# Patient Record
Sex: FEMALE | Race: BLACK OR AFRICAN AMERICAN | NOT HISPANIC OR LATINO | RURAL
[De-identification: names, ages, dates, MRNs, and addresses within clinical notes are randomized per-mention and may not be internally consistent; named-entity substitution may affect disease eponyms.]

---

## 2022-07-14 RX ORDER — TIZANIDINE 4 MG/1
4 TABLET ORAL 2 TIMES DAILY
COMMUNITY

## 2022-07-14 RX ORDER — DULOXETIN HYDROCHLORIDE 60 MG/1
60 CAPSULE, DELAYED RELEASE ORAL DAILY
COMMUNITY

## 2022-07-14 RX ORDER — HYDROCODONE BITARTRATE AND ACETAMINOPHEN 10; 325 MG/1; MG/1
1 TABLET ORAL EVERY 8 HOURS PRN
COMMUNITY

## 2022-07-14 RX ORDER — MELOXICAM 7.5 MG/1
7.5 TABLET ORAL DAILY
COMMUNITY

## 2022-07-20 ENCOUNTER — HOSPITAL ENCOUNTER (OUTPATIENT)
Facility: HOSPITAL | Age: 67
Discharge: HOME | End: 2022-07-20
Attending: ANESTHESIOLOGY | Admitting: ANESTHESIOLOGY
Payer: OTHER MISCELLANEOUS

## 2022-07-20 ENCOUNTER — ANESTHESIA EVENT (OUTPATIENT)
Dept: PAIN MEDICINE | Facility: HOSPITAL | Age: 67
End: 2022-07-20
Payer: OTHER MISCELLANEOUS

## 2022-07-20 ENCOUNTER — ANESTHESIA (OUTPATIENT)
Dept: PAIN MEDICINE | Facility: HOSPITAL | Age: 67
End: 2022-07-20
Payer: OTHER MISCELLANEOUS

## 2022-07-20 VITALS
WEIGHT: 179 LBS | SYSTOLIC BLOOD PRESSURE: 122 MMHG | HEIGHT: 63 IN | HEART RATE: 60 BPM | DIASTOLIC BLOOD PRESSURE: 62 MMHG | RESPIRATION RATE: 18 BRPM | OXYGEN SATURATION: 97 % | TEMPERATURE: 98 F | BODY MASS INDEX: 31.71 KG/M2

## 2022-07-20 DIAGNOSIS — M54.12 CERVICAL RADICULOPATHY: ICD-10-CM

## 2022-07-20 PROCEDURE — 63600175 PHARM REV CODE 636 W HCPCS: Performed by: ANESTHESIOLOGY

## 2022-07-20 PROCEDURE — 62321 NJX INTERLAMINAR CRV/THRC: CPT | Performed by: ANESTHESIOLOGY

## 2022-07-20 PROCEDURE — 27000284 HC CANNULA NASAL: Performed by: NURSE ANESTHETIST, CERTIFIED REGISTERED

## 2022-07-20 PROCEDURE — 37000008 HC ANESTHESIA 1ST 15 MINUTES: Performed by: ANESTHESIOLOGY

## 2022-07-20 PROCEDURE — D9220A PRA ANESTHESIA: ICD-10-PCS | Mod: ,,, | Performed by: NURSE ANESTHETIST, CERTIFIED REGISTERED

## 2022-07-20 PROCEDURE — 25000003 PHARM REV CODE 250: Performed by: NURSE ANESTHETIST, CERTIFIED REGISTERED

## 2022-07-20 PROCEDURE — 25000003 PHARM REV CODE 250: Performed by: ANESTHESIOLOGY

## 2022-07-20 PROCEDURE — 27201423 OPTIME MED/SURG SUP & DEVICES STERILE SUPPLY: Performed by: ANESTHESIOLOGY

## 2022-07-20 PROCEDURE — 63600175 PHARM REV CODE 636 W HCPCS: Performed by: NURSE ANESTHETIST, CERTIFIED REGISTERED

## 2022-07-20 PROCEDURE — D9220A PRA ANESTHESIA: Mod: ,,, | Performed by: NURSE ANESTHETIST, CERTIFIED REGISTERED

## 2022-07-20 PROCEDURE — 25500020 PHARM REV CODE 255: Performed by: ANESTHESIOLOGY

## 2022-07-20 RX ORDER — FENTANYL CITRATE 50 UG/ML
INJECTION, SOLUTION INTRAMUSCULAR; INTRAVENOUS
Status: DISCONTINUED | OUTPATIENT
Start: 2022-07-20 | End: 2022-07-20

## 2022-07-20 RX ORDER — SODIUM CHLORIDE 9 MG/ML
INJECTION, SOLUTION INTRAVENOUS CONTINUOUS PRN
Status: DISCONTINUED | OUTPATIENT
Start: 2022-07-20 | End: 2022-07-20

## 2022-07-20 RX ORDER — TRIAMCINOLONE ACETONIDE 40 MG/ML
INJECTION, SUSPENSION INTRA-ARTICULAR; INTRAMUSCULAR
Status: DISCONTINUED | OUTPATIENT
Start: 2022-07-20 | End: 2022-07-20 | Stop reason: HOSPADM

## 2022-07-20 RX ORDER — IOPAMIDOL 612 MG/ML
INJECTION, SOLUTION INTRATHECAL
Status: DISCONTINUED | OUTPATIENT
Start: 2022-07-20 | End: 2022-07-20 | Stop reason: HOSPADM

## 2022-07-20 RX ORDER — AMLODIPINE BESYLATE 10 MG/1
10 TABLET ORAL DAILY
COMMUNITY

## 2022-07-20 RX ORDER — ATORVASTATIN CALCIUM 20 MG/1
20 TABLET, FILM COATED ORAL DAILY
COMMUNITY

## 2022-07-20 RX ORDER — BUPIVACAINE HYDROCHLORIDE 2.5 MG/ML
INJECTION, SOLUTION INFILTRATION; PERINEURAL
Status: DISCONTINUED | OUTPATIENT
Start: 2022-07-20 | End: 2022-07-20 | Stop reason: HOSPADM

## 2022-07-20 RX ORDER — PROPOFOL 10 MG/ML
VIAL (ML) INTRAVENOUS
Status: DISCONTINUED | OUTPATIENT
Start: 2022-07-20 | End: 2022-07-20

## 2022-07-20 RX ORDER — LIDOCAINE HYDROCHLORIDE 20 MG/ML
INJECTION, SOLUTION EPIDURAL; INFILTRATION; INTRACAUDAL; PERINEURAL
Status: DISCONTINUED | OUTPATIENT
Start: 2022-07-20 | End: 2022-07-20

## 2022-07-20 RX ADMIN — LIDOCAINE HYDROCHLORIDE 50 MG: 20 INJECTION, SOLUTION EPIDURAL; INFILTRATION; INTRACAUDAL; PERINEURAL at 11:07

## 2022-07-20 RX ADMIN — SODIUM CHLORIDE: 9 INJECTION, SOLUTION INTRAVENOUS at 11:07

## 2022-07-20 RX ADMIN — PROPOFOL 50 MG: 10 INJECTION, EMULSION INTRAVENOUS at 11:07

## 2022-07-20 RX ADMIN — FENTANYL CITRATE 100 MCG: 50 INJECTION INTRAMUSCULAR; INTRAVENOUS at 11:07

## 2022-07-20 NOTE — PLAN OF CARE
Plan:     D/c pt via wheelchair at 1235    Informed pt if does not void in 8 hours to go to ER. Notify if redness, drainage, from injection site or fever over next 3-4 days. Rest and drink plenty of fluids for the remainder of the day. No lifting over 5 lbs. For the remainder of the day. Continue regular medications as prescribed. May take pain medications as prescribed.     Pain improved 100%

## 2022-07-20 NOTE — OP NOTE
PREOPERATIVE DIAGNOSIS:     Cervical Radiculopathy                                                                Neck Pain         POSTOPERATIVE DIAGNOSIS:  Cervical Radiculopathy                                                                Neck Pain         PROCEDURE:  Catheter Guided Cervical Epidural Steroid Injection under Fluoroscopic Guidance at C5-6 level.          COMPLICATIONS: None    ANESTHESIA:  MAC    DRAINS AND PACKS:  None    BLOOD LOSS:  None    The patient was identified in the holding area.  The risks and benefits of the procedure were again explained to the patient and the patient agreed to proceed.  The patient was taken in stable condition to the procedure room and was placed in prone position on the C-Arm table.  All pressure points were checked and padded comfortably while the patient was awake.  Time out was completed.  Standard ASA monitors applied.  Anesthesia was initiated.  Patient was comfortable and the neck was then prepped and draped in usual sterile fashion. The skin wheal  using Sensorcaine 0.25% (2.5 mg/ml) 1cc was raised over the C7-T1 interspaces and an 18 gauge needle was advanced under fluoroscopic guidance into the epidural space with loss of resistance confirmed with air.  The stylet was removed and there was negative aspiration of heme and CSF.  A Versicath Catheter was advanced to the target level at  C 5-6  level. The patient then received a 2 cc allotment of Isovue-m300 contrast with excellent delineation within the epidural space.  After confirmation and appropriate placement of the cannula, the patient then received  Kenalog 40mg/ml 1ml with 2ccs of 0.25% Sensorcaine(2.5mg/ml) and 2ccs of preservative free Saline.  The needle was removed with tip intact.  There was adequate hemostasis at the conclusion of the procedure. The patient was taken in stable condition to the holding area and monitored for the appropriate time of convalescence.  The patient tolerated the  procedure well with no adverse events.          The patients preoperative pain score was 10 /10.     The postoperative pain score is  /10.

## 2022-07-20 NOTE — TRANSFER OF CARE
"Anesthesia Transfer of Care Note    Patient: Kaycee Munguia    Procedure(s) Performed: Procedure(s) (LRB):  INJECTION, STEROID, SPINE, CERVICAL, EPIDURAL (N/A)    Patient location: PACU    Anesthesia Type: general    Transport from OR: Transported from OR on room air with adequate spontaneous ventilation    Post pain: adequate analgesia    Post assessment: no apparent anesthetic complications    Post vital signs: stable    Level of consciousness: responds to stimulation    Nausea/Vomiting: no nausea/vomiting    Complications: none    Transfer of care protocol was followed      Last vitals:   Visit Vitals  BP (!) 145/68   Pulse 83   Temp 36.7 °C (98 °F)   Resp 14   Ht 5' 3" (1.6 m)   Wt 81.2 kg (179 lb)   SpO2 96%   BMI 31.71 kg/m²     "

## 2022-07-20 NOTE — ANESTHESIA POSTPROCEDURE EVALUATION
Anesthesia Post Evaluation    Patient: Kaycee Munguia    Procedure(s) Performed: Procedure(s) (LRB):  INJECTION, STEROID, SPINE, CERVICAL, EPIDURAL (N/A)    Final Anesthesia Type: general      Patient location during evaluation: PACU  Patient participation: Yes- Able to Participate  Level of consciousness: awake and alert  Post-procedure vital signs: reviewed and stable  Pain management: adequate  Airway patency: patent  HUBER mitigation strategies: Multimodal analgesia  PONV status at discharge: No PONV  Anesthetic complications: no      Cardiovascular status: blood pressure returned to baseline  Respiratory status: unassisted  Hydration status: euvolemic  Follow-up not needed.          Vitals Value Taken Time   /54 07/20/22 1215   Temp 36.7 °C (98 °F) 07/20/22 1158   Pulse 58 07/20/22 1222   Resp 15 07/20/22 1215   SpO2 97 % 07/20/22 1222   Vitals shown include unvalidated device data.      No case tracking events are documented in the log.      Pain/Mavis Score: Mavis Score: 10 (7/20/2022 12:15 PM)

## 2022-07-20 NOTE — DISCHARGE INSTRUCTIONS
No driving, operating machinery, making legal decisions, or signing legal documents until tomorrow.   Continue diet as tolerated. Drink plenty of fluids and rest.   If unable to void in 8 hours proceed to nearest ER.   Notify MD of redness or drainage from incision site as well as any fever over the next 3-4 days.  No lifting over 5 lbs for the next 24 hrs.   Continue medications as prescribed. May take pain medication as prescribed.   May shower tomorrow. No tub baths for 48 hours following procedure.   May remove bandaids tomorrow, if they fall off before tomorrow they do not have to be replaced.

## 2022-07-20 NOTE — ANESTHESIA PREPROCEDURE EVALUATION
07/20/2022  Kaycee Munguia is a 67 y.o., female.      Pre-op Assessment    I have reviewed the Patient Summary Reports.     I have reviewed the Nursing Notes. I have reviewed the NPO Status.   I have reviewed the Medications.     Review of Systems  Anesthesia Hx:  No problems with previous Anesthesia  Family Hx of Anesthesia complications:  Personal Hx of Anesthesia complications   Social:  Non-Smoker    Hematology/Oncology:  Hematology Normal   Oncology Normal     EENT/Dental:EENT/Dental Normal   Cardiovascular:   Hypertension hyperlipidemia    Pulmonary:   Sleep Apnea    Renal/:  Renal/ Normal     Hepatic/GI:  Hepatic/GI Normal    Musculoskeletal:  Musculoskeletal Normal    Neurological:  Neurology Normal    Endocrine:  Endocrine Normal  Obesity / BMI > 30  Dermatological:  Skin Normal    Psych:  Psychiatric Normal           Physical Exam  General: Well nourished, Cooperative, Alert and Oriented    Airway:  Mallampati: II   Mouth Opening: Normal  TM Distance: Normal  Tongue: Normal  Neck ROM: Normal ROM    Chest/Lungs:  Clear to auscultation, Normal Respiratory Rate    Heart:  Rate: Normal  Rhythm: Regular Rhythm    Abdomen:  Normal, Soft        Anesthesia Plan  Type of Anesthesia, risks & benefits discussed:    Anesthesia Type: Gen Natural Airway  Intra-op Monitoring Plan: Standard ASA Monitors  Post Op Pain Control Plan: multimodal analgesia  Induction:  IV  Informed Consent: Informed consent signed with the Patient and all parties understand the risks and agree with anesthesia plan.  All questions answered. Patient consented to blood products? Yes  ASA Score: 3    Ready For Surgery From Anesthesia Perspective.     .

## 2023-05-17 ENCOUNTER — ANESTHESIA EVENT (OUTPATIENT)
Dept: PAIN MEDICINE | Facility: HOSPITAL | Age: 68
End: 2023-05-17
Payer: OTHER MISCELLANEOUS

## 2023-05-17 ENCOUNTER — ANESTHESIA (OUTPATIENT)
Dept: PAIN MEDICINE | Facility: HOSPITAL | Age: 68
End: 2023-05-17
Payer: OTHER MISCELLANEOUS

## 2023-05-17 ENCOUNTER — HOSPITAL ENCOUNTER (OUTPATIENT)
Facility: HOSPITAL | Age: 68
Discharge: HOME | End: 2023-05-17
Attending: ANESTHESIOLOGY | Admitting: ANESTHESIOLOGY
Payer: OTHER MISCELLANEOUS

## 2023-05-17 VITALS
BODY MASS INDEX: 32.57 KG/M2 | HEIGHT: 62 IN | SYSTOLIC BLOOD PRESSURE: 131 MMHG | HEART RATE: 56 BPM | DIASTOLIC BLOOD PRESSURE: 67 MMHG | RESPIRATION RATE: 16 BRPM | WEIGHT: 177 LBS | TEMPERATURE: 97 F | OXYGEN SATURATION: 98 %

## 2023-05-17 DIAGNOSIS — M54.12 CERVICAL RADICULOPATHY: ICD-10-CM

## 2023-05-17 PROCEDURE — 27000716 HC OXISENSOR PROBE, ANY SIZE: Performed by: NURSE ANESTHETIST, CERTIFIED REGISTERED

## 2023-05-17 PROCEDURE — 63600175 PHARM REV CODE 636 W HCPCS: Performed by: ANESTHESIOLOGY

## 2023-05-17 PROCEDURE — 25000003 PHARM REV CODE 250: Performed by: ANESTHESIOLOGY

## 2023-05-17 PROCEDURE — D9220A PRA ANESTHESIA: Mod: ,,, | Performed by: NURSE ANESTHETIST, CERTIFIED REGISTERED

## 2023-05-17 PROCEDURE — 25000003 PHARM REV CODE 250: Performed by: NURSE ANESTHETIST, CERTIFIED REGISTERED

## 2023-05-17 PROCEDURE — 25500020 PHARM REV CODE 255: Performed by: ANESTHESIOLOGY

## 2023-05-17 PROCEDURE — 63600175 PHARM REV CODE 636 W HCPCS: Performed by: NURSE ANESTHETIST, CERTIFIED REGISTERED

## 2023-05-17 PROCEDURE — 62321 NJX INTERLAMINAR CRV/THRC: CPT | Performed by: ANESTHESIOLOGY

## 2023-05-17 PROCEDURE — D9220A PRA ANESTHESIA: ICD-10-PCS | Mod: ,,, | Performed by: NURSE ANESTHETIST, CERTIFIED REGISTERED

## 2023-05-17 PROCEDURE — 37000008 HC ANESTHESIA 1ST 15 MINUTES: Performed by: ANESTHESIOLOGY

## 2023-05-17 PROCEDURE — 27000284 HC CANNULA NASAL: Performed by: NURSE ANESTHETIST, CERTIFIED REGISTERED

## 2023-05-17 RX ORDER — TRIAMCINOLONE ACETONIDE 40 MG/ML
INJECTION, SUSPENSION INTRA-ARTICULAR; INTRAMUSCULAR CODE/TRAUMA/SEDATION MEDICATION
Status: DISCONTINUED | OUTPATIENT
Start: 2023-05-17 | End: 2023-05-17 | Stop reason: HOSPADM

## 2023-05-17 RX ORDER — SODIUM CHLORIDE 9 MG/ML
500 INJECTION, SOLUTION INTRAVENOUS CONTINUOUS
Status: DISCONTINUED | OUTPATIENT
Start: 2023-05-17 | End: 2023-05-17 | Stop reason: HOSPADM

## 2023-05-17 RX ORDER — PROPOFOL 10 MG/ML
INJECTION, EMULSION INTRAVENOUS
Status: DISCONTINUED | OUTPATIENT
Start: 2023-05-17 | End: 2023-05-17

## 2023-05-17 RX ORDER — LIDOCAINE HYDROCHLORIDE 20 MG/ML
INJECTION INTRAVENOUS
Status: DISCONTINUED | OUTPATIENT
Start: 2023-05-17 | End: 2023-05-17

## 2023-05-17 RX ORDER — BUPIVACAINE HYDROCHLORIDE 2.5 MG/ML
INJECTION, SOLUTION INFILTRATION; PERINEURAL CODE/TRAUMA/SEDATION MEDICATION
Status: DISCONTINUED | OUTPATIENT
Start: 2023-05-17 | End: 2023-05-17 | Stop reason: HOSPADM

## 2023-05-17 RX ORDER — IOPAMIDOL 612 MG/ML
INJECTION, SOLUTION INTRATHECAL CODE/TRAUMA/SEDATION MEDICATION
Status: DISCONTINUED | OUTPATIENT
Start: 2023-05-17 | End: 2023-05-17 | Stop reason: HOSPADM

## 2023-05-17 RX ADMIN — LIDOCAINE HYDROCHLORIDE 100 MG: 20 INJECTION, SOLUTION INTRAVENOUS at 01:05

## 2023-05-17 RX ADMIN — SODIUM CHLORIDE: 9 INJECTION, SOLUTION INTRAVENOUS at 01:05

## 2023-05-17 RX ADMIN — PROPOFOL 100 MG: 10 INJECTION, EMULSION INTRAVENOUS at 01:05

## 2023-05-17 NOTE — ANESTHESIA POSTPROCEDURE EVALUATION
Anesthesia Post Evaluation    Patient: Kaycee Munguia    Procedure(s) Performed: Procedure(s) (LRB):  INJECTION, STEROID, SPINE, CERVICAL, EPIDURAL (N/A)    Final Anesthesia Type: general      [facility]  Patient participation: Yes- Able to Participate  Level of consciousness: awake and alert  Post-procedure vital signs: reviewed and stable  Pain management: adequate  Airway patency: patent    PONV status at discharge: No PONV  Anesthetic complications: no      Cardiovascular status: blood pressure returned to baseline, hemodynamically stable and stable  Respiratory status: unassisted  Hydration status: euvolemic  Follow-up not needed.  Comments: Pt voices appreciation for care          Vitals Value Taken Time   /66 05/17/23 1406   Temp 97.3 05/17/23 1610   Pulse 58 05/17/23 1407   Resp 14 05/17/23 1407   SpO2 98 % 05/17/23 1407   Vitals shown include unvalidated device data.      Event Time   Out of Recovery 14:06:00         Pain/Mavis Score: Mavis Score: 10 (5/17/2023  2:05 PM)

## 2023-05-17 NOTE — DISCHARGE SUMMARY
Patient underwent  Catheter Guided Cervical Epidural Steroid Injection under Fluoroscopic Guidance at C5/6 level  procedure 05/17/2023. The pt will follow up in clinic. Discharged home. Discharge Dx:Cervical Radiculopathy

## 2023-05-17 NOTE — OP NOTE
05/17/2023  PREOPERATIVE DIAGNOSIS:     Cervical Radiculopathy                                                                Neck Pain         POSTOPERATIVE DIAGNOSIS:  Cervical Radiculopathy                                                                Neck Pain         PROCEDURE:  Catheter Guided Cervical Epidural Steroid Injection under Fluoroscopic Guidance at C 5/6 level.          SURGEON: Dr Hernán Dang    COMPLICATIONS: None    ANESTHESIA:  MAC    DRAINS AND PACKS:  None    BLOOD LOSS:  None    The patient was identified in the holding area.  The risks and benefits of the procedure were again explained to the patient and the patient agreed to proceed.  The patient was taken in stable condition to the procedure room and was placed in prone position on the C-Arm table.  All pressure points were checked and padded comfortably while the patient was awake.  Time out was completed.  Standard ASA monitors applied.  Anesthesia was initiated.  Patient was comfortable and the neck was then prepped and draped in usual sterile fashion. The skin wheal  using Bupivacaine 0.25% (2.5 mg/ml) 1cc was raised over the C7-T1 interspaces and an 18 gauge needle was advanced under fluoroscopic guidance into the epidural space with loss of resistance confirmed with air.  The stylet was removed and there was negative aspiration of heme and CSF.  A Versicath Catheter was advanced to the target level at  C 5-6  level. The patient then received a 2 cc allotment of Isovue M 300 contrast with excellent delineation within the epidural space.  After confirmation and appropriate placement of the cannula, the patient then received  Kenalog 40mg/ml 1ml with 2ccs of 0.25% Bupivacaine(2.5mg/ml) and 2ccs of preservative free Saline.  The needle was removed with tip intact.  There was adequate hemostasis at the conclusion of the procedure. The patient was taken in stable condition to the holding area and monitored for the appropriate time of  convalescence.  The patient tolerated the procedure well with no adverse events.     The patients preoperative pain score was  10/10.     The postoperative pain score is  0/10.

## 2023-05-17 NOTE — H&P
No changes from original H & P note dated 04/24/2023 and has been reviewed and is valid. See scanned note.

## 2023-05-17 NOTE — ANESTHESIA PREPROCEDURE EVALUATION
05/17/2023  Kaycee Munguia is a 67 y.o., female.      Pre-op Assessment    I have reviewed the Patient Summary Reports.     I have reviewed the Nursing Notes. I have reviewed the NPO Status.   I have reviewed the Medications.     Review of Systems  Anesthesia Hx:  No problems with previous Anesthesia    Social:  Non-Smoker, No Alcohol Use    Cardiovascular:   Hypertension, well controlled    Pulmonary:   Sleep Apnea, CPAP    Endocrine:  Obesity / BMI > 30      Physical Exam  General: Well nourished, Cooperative, Alert and Oriented    Airway:  Mallampati: II   Mouth Opening: Normal  TM Distance: Normal  Tongue: Normal  Neck ROM: Normal ROM    Dental:  Dentures        Anesthesia Plan  Type of Anesthesia, risks & benefits discussed:    Anesthesia Type: Gen Natural Airway, MAC  Intra-op Monitoring Plan: Standard ASA Monitors  Post Op Pain Control Plan: multimodal analgesia and IV/PO Opioids PRN  Induction:  IV  Informed Consent: Informed consent signed with the Patient and all parties understand the risks and agree with anesthesia plan.  All questions answered. Patient consented to blood products? Yes  ASA Score: 3  Day of Surgery Review of History & Physical: I have interviewed and examined the patient. I have reviewed the patient's H&P dated: There are no significant changes.     Ready For Surgery From Anesthesia Perspective.     .   Past Medical History:   Diagnosis Date    Hyperlipidemia     Hypertension     Sleep apnea        Past Surgical History:   Procedure Laterality Date    EPIDURAL STEROID INJECTION INTO CERVICAL SPINE N/A 7/20/2022    Procedure: INJECTION, STEROID, SPINE, CERVICAL, EPIDURAL;  Surgeon: Hernán Dang MD;  Location: [facility];  Service: Pain Management;  Laterality: N/A;  C5-6 cath guided MARIAH    HYSTERECTOMY         Family History   Problem Relation Age of Onset     Cancer Mother     Heart disease Father        Social History     Socioeconomic History    Marital status: Single   Tobacco Use    Smoking status: Never    Smokeless tobacco: Never   Substance and Sexual Activity    Alcohol use: Not Currently       No current facility-administered medications for this encounter.       Review of patient's allergies indicates:  No Known Allergies  There is no problem list on file for this patient.

## 2023-05-17 NOTE — PLAN OF CARE
REFER TO WRITTEN DOCUMENT AND RECOVERY INFORMATION.    D/CD PATIENT VIAA WHEELCHAIR AT 1415.    INFORMED PATIENT IF UNABLE TO VOID IN 8 HOURS, GO TO ER. NOTIFY MD OF REDNESS OR DRAINAGE FROM INJECTION SITE OR FEVER OVER 3-4 DAY. REST AND DRINK PLENTY OF FLUIDS FOR THE REMAINDER OF THE DAY. NO LIFTING OVER 5 LBS FOR THE REMAINDER OF THE DAY. CONTINUE REGULAR MEDICATIONS AS PRESCRIBED. MAY TAKE PAIN MEDICATION AS PRESCRIBED.     PAIN IMPROVED  100%

## 2023-05-17 NOTE — TRANSFER OF CARE
"Anesthesia Transfer of Care Note    Patient: Kaycee Munguia    Procedure(s) Performed: Procedure(s) (LRB):  INJECTION, STEROID, SPINE, CERVICAL, EPIDURAL (N/A)    Patient location: Other: [facility]    Anesthesia Type: general    Transport from OR: Transported from OR on room air with adequate spontaneous ventilation    Post pain: adequate analgesia    Post assessment: no apparent anesthetic complications    Post vital signs: stable    Level of consciousness: sedated and responds to stimulation    Nausea/Vomiting: no nausea/vomiting    Complications: none    Transfer of care protocol was followedComments: Good SV continue, NAD noted, VSS, RTRN      Last vitals:   Visit Vitals  BP (!) 105/58 (BP Location: Left arm, Patient Position: Lying)   Pulse 85   Temp 36.3 °C (97.4 °F) (Oral)   Resp 16   Ht 5' 2" (1.575 m)   Wt 80.3 kg (177 lb)   SpO2 96%   BMI 32.37 kg/m²     "

## 2024-10-16 ENCOUNTER — ANESTHESIA EVENT (OUTPATIENT)
Dept: PAIN MEDICINE | Facility: HOSPITAL | Age: 69
End: 2024-10-16
Payer: OTHER MISCELLANEOUS

## 2024-10-16 ENCOUNTER — HOSPITAL ENCOUNTER (OUTPATIENT)
Facility: HOSPITAL | Age: 69
Discharge: HOME | End: 2024-10-16
Attending: ANESTHESIOLOGY | Admitting: ANESTHESIOLOGY
Payer: OTHER MISCELLANEOUS

## 2024-10-16 ENCOUNTER — ANESTHESIA (OUTPATIENT)
Dept: PAIN MEDICINE | Facility: HOSPITAL | Age: 69
End: 2024-10-16
Payer: OTHER MISCELLANEOUS

## 2024-10-16 VITALS
DIASTOLIC BLOOD PRESSURE: 73 MMHG | RESPIRATION RATE: 17 BRPM | HEART RATE: 66 BPM | OXYGEN SATURATION: 100 % | WEIGHT: 187.81 LBS | TEMPERATURE: 98 F | BODY MASS INDEX: 35.46 KG/M2 | HEIGHT: 61 IN | SYSTOLIC BLOOD PRESSURE: 138 MMHG

## 2024-10-16 DIAGNOSIS — M54.12 CERVICAL RADICULOPATHY: ICD-10-CM

## 2024-10-16 PROCEDURE — 63600175 PHARM REV CODE 636 W HCPCS: Mod: JG | Performed by: ANESTHESIOLOGY

## 2024-10-16 PROCEDURE — 62321 NJX INTERLAMINAR CRV/THRC: CPT | Performed by: ANESTHESIOLOGY

## 2024-10-16 PROCEDURE — 37000008 HC ANESTHESIA 1ST 15 MINUTES: Performed by: ANESTHESIOLOGY

## 2024-10-16 PROCEDURE — 25500020 PHARM REV CODE 255: Performed by: ANESTHESIOLOGY

## 2024-10-16 PROCEDURE — 63600175 PHARM REV CODE 636 W HCPCS: Performed by: ANESTHESIOLOGY

## 2024-10-16 PROCEDURE — 37000009 HC ANESTHESIA EA ADD 15 MINS: Performed by: ANESTHESIOLOGY

## 2024-10-16 PROCEDURE — 27000284 HC CANNULA NASAL: Performed by: ANESTHESIOLOGY

## 2024-10-16 RX ORDER — TRIAMCINOLONE ACETONIDE 40 MG/ML
INJECTION, SUSPENSION INTRA-ARTICULAR; INTRAMUSCULAR CODE/TRAUMA/SEDATION MEDICATION
Status: DISCONTINUED | OUTPATIENT
Start: 2024-10-16 | End: 2024-10-16 | Stop reason: HOSPADM

## 2024-10-16 RX ORDER — LIDOCAINE HYDROCHLORIDE 20 MG/ML
INJECTION, SOLUTION EPIDURAL; INFILTRATION; INTRACAUDAL; PERINEURAL
Status: DISCONTINUED | OUTPATIENT
Start: 2024-10-16 | End: 2024-10-16

## 2024-10-16 RX ORDER — IOPAMIDOL 612 MG/ML
INJECTION, SOLUTION INTRATHECAL CODE/TRAUMA/SEDATION MEDICATION
Status: DISCONTINUED | OUTPATIENT
Start: 2024-10-16 | End: 2024-10-16 | Stop reason: HOSPADM

## 2024-10-16 RX ORDER — PROPOFOL 10 MG/ML
VIAL (ML) INTRAVENOUS
Status: DISCONTINUED | OUTPATIENT
Start: 2024-10-16 | End: 2024-10-16

## 2024-10-16 RX ORDER — GLYCOPYRROLATE 0.2 MG/ML
INJECTION INTRAMUSCULAR; INTRAVENOUS
Status: DISCONTINUED | OUTPATIENT
Start: 2024-10-16 | End: 2024-10-16

## 2024-10-16 RX ORDER — BUPIVACAINE HYDROCHLORIDE 2.5 MG/ML
INJECTION, SOLUTION INFILTRATION; PERINEURAL CODE/TRAUMA/SEDATION MEDICATION
Status: DISCONTINUED | OUTPATIENT
Start: 2024-10-16 | End: 2024-10-16 | Stop reason: HOSPADM

## 2024-10-16 RX ADMIN — PROPOFOL 50 MG: 10 INJECTION, EMULSION INTRAVENOUS at 10:10

## 2024-10-16 RX ADMIN — LIDOCAINE HYDROCHLORIDE 50 MG: 20 INJECTION, SOLUTION EPIDURAL; INFILTRATION; INTRACAUDAL; PERINEURAL at 10:10

## 2024-10-16 RX ADMIN — GLYCOPYRROLATE 0.2 MG: 0.2 INJECTION INTRAMUSCULAR; INTRAVENOUS at 10:10

## 2024-10-16 NOTE — DISCHARGE SUMMARY
Patient underwent  Catheter Guided Cervical Epidural Steroid Injection under Fluoroscopic Guidance at C5-6 level  procedure 10/16/2024. The pt will follow up in clinic. Discharged home. Discharge Dx:  Cervical Radiculopathy

## 2024-10-16 NOTE — TRANSFER OF CARE
"Anesthesia Transfer of Care Note    Patient: Kaycee Munguia    Procedure(s) Performed: Procedure(s) (LRB):  INJECTION, STEROID, SPINE, CERVICAL, EPIDURAL (N/A)    Patient location: PACU    Anesthesia Type: MAC    Transport from OR: Transported from OR on room air with adequate spontaneous ventilation    Post pain: adequate analgesia    Post assessment: no apparent anesthetic complications    Post vital signs: stable    Level of consciousness: awake and responds to stimulation    Nausea/Vomiting: no nausea/vomiting    Complications: none    Transfer of care protocol was followed      Last vitals: Visit Vitals  /61   Pulse 77   Temp 36.7 °C (98 °F) (Oral)   Resp 19   Ht 5' 1" (1.549 m)   Wt 85.2 kg (187 lb 12.8 oz)   SpO2 98%   BMI 35.48 kg/m²     "
34 weeks

## 2024-10-16 NOTE — H&P
[facility] - Pain Management  Pain Management  H&P    Patient Name: Kaycee Munguia  MRN: [id number]  Admission Date: 10/16/2024  Primary Care Provider: Megan Hendricks MD    Patient information was obtained from     Subjective:     Principal Problem:     PENELOPE CONTRERAS MD,P.A.  [address] [place] [address],[place] [id number]                      RE: Kaycee Munguia      : 1955   Date of Service: 2024   Medication Refill   Existing Patient 3 month follow up           Chief Complaint:   Joint pain aching in nature, burning, dull, sharp; located in the bilateral shoulder cervical spine lumbar spine wrist; aggravated by activity; relieved by rest - lying down, narcotics; gradual in occurrence constant; pain rated as 7/10 on the pain scale,  Neck pain described as aching, burning, stabbing, tingling; Location bilaterally, radiating to shoulder(s); associated with headache, numbness, paresthesias; aggravated by activity; relieved by rest-lying down; onset constant; reported as 7/10 on pain scale; Relieved by medication and injections; on today's visit the patient's pain is worse in nature from last visit.   Patient seated in room 4 with c/o multiple joint, neck pain.  Controlled Substance Prescription Agreement signed and reviewed. Verbalizes understanding.  [place] Prescription Monitoring Program data was reviewed for this patient for the past 12 calendar months to ascertain any current,or past use of scheduled medications.Good                                                                                    Opioid Risk Tool                                                                                 Jose each box                           Item Score                        Item Score                                                                              that applies.                               if Female.                          if Male                    1. Family history of substance abuse                   Alcohol  (  )                                      1                                     3                                                                              Illegal Drugs (  )                               2                                     3                                                                              Prescription Drugs (  )                     4                                     4        2. Personal History of substance abuse          Alcohol  (  )                                      3                                      3                                                                             Illegal Drugs (  )                               4                                     4                                                                             Prescription Drugs (  )                     5                                      5     3. Age (Jose box if 16-45)                                           (  )                                          1                                      1        4. History of Preadolescent Sexual Abuse                   (  )                                         3                                      0        5. Psychological Disease    Attention Deficit disorder  (  )                                         2                                       2                                                             or                                              Obsessive Compulsive                                                           disorder                                                               or                                                                                       Bipolar Schizophrenia                                                              Depression                        (  )                                         1                                        1        Total=0     Total Score  Risk Category           Low risk 0-3         Moderate risk 4-7              High risk >8      History of Present Illness:   What part of the body? joint and neck   Pain level at best 2; Pain level at worst 10; Pain level at present 7   Chronic neck pain r/t workers comp injury.      06/23/2022-Pt here for a follow up and would like to schedule procedure for her neck and arm pain. She is having pain that radiates down her arm into her shoulder, down her arm to her hand. After review of her chart we will plan to continue her meds and set her up for cervical cath guided MARIAH at C5-6 level for dx of cervical radiculopathy.     07/20/2022-Cath guided MARIAH @ C5-6 level.      08/04/2022-Pt here for a follow up after procedure. She reports that her neck and arm pain are improved after the injection. With the injection and her pain meds. her pain today is a 0. We will continue her meds and see her back in 3 months.      01/23/2023-Patient presents today for follow-up evaluation for pain problems. Patient has no new complaints today, and presents for medical management. There is no abuse or diversion of the patient's medications. The patient is tolerating the current regimen well with no adverse events, side effects, or untoward complications. Activities are tolerated well and function is maintained with current pain medications, UDS reviewed. Patient presents for refills of medications.  reviewed and shows consistency.     04/06/2023- Pt here for a follow up for her meds and she is ready to set up Cervical MARIAH for her neck and arm pain. After review of her records and discussion with the pt we will plan to schedule her for Cath guided MARIAH . Her last Cath guided MARIAH was 07/20/2022 and she has had > 80% relief of her pain with >50 % improvement in her activity for the first 3-4 months after her procedure the pain has returned and she rates it at 81/2 today.      5/17/23 Cath Guided MARIAH @ C5-6 Pre 10 Post 0     06/15/2023- pt  here for a follow up after her procedure. Her pain today is 0/10 and reports that the injection helped with her pain by > 80% and this improved her activity. Her medications also help with her pain . We will continue her meds and see her back in 3 mo.      09/18/2023-Current Treatment: Medications and procedures. There are no new significant side effects or excessive drowsiness from medications.  Patient presents today for follow-up evaluation for pain problems. Patient has no new complaints today, and presents for medical management. There is no abuse or diversion of the patient's medications. The patient is tolerating the current regimen well with no adverse events, side effects, or untoward complications. Activities are tolerated well and function is maintained with current pain medications, UDS reviewed. Pt defers procedures @ this time. Will plan to continue current medications and follow up in 3 months.     12/11/2023-Current Treatment: Medications and procedures. There are no new significant side effects or excessive drowsiness from medications.  Patient presents today for follow-up evaluation for pain problems. Patient has no new complaints today, and presents for medical management. There is no abuse or diversion of the patient's medications. The patient is tolerating the current regimen well with no adverse events, side effects, or untoward complications. Activities are tolerated well and function is maintained with current pain medications, UDS reviewed. Pt defers procedures @ this time. Will plan to continue current medications and follow up in 3 months.     03/18/2024-Current Treatment: Medications and procedures. There are no new significant side effects or excessive drowsiness from medications.Patient presents today for follow-up evaluation for pain problems. Patient has no new complaints today, and presents for medical management. There is no abuse or diversion of the patient's medications. The  patient is tolerating the current regimen well with no adverse events, side effects, or untoward complications. Activities are tolerated well and function is maintained with current pain medications, DS reviewed. Pt defers procedures @ this time. Will plan to continue current medications and follow up in 3 months.     06/17/024-Current Treatment: Medications and procedures. There are no new significant side effects or excessive drowsiness from medications.  Patient presents today for follow-up evaluation for pain problems. Patient has no new complaints today, and presents for medical management. There is no abuse or diversion of the patient's medications. The patient is tolerating the current regimen well with no adverse events, side effects, or untoward complications. Activities are tolerated well and function is maintained with current pain medications, UDS reviewed. Pt defers procedures @ this time. Will plan to continue current medications and follow up in 3 months.     09/19/2024-pt here for a follow up and reports that she would like to schedule procedure for neck and arm pain that she has had in the past. WE have reviewed her records and will plan to schedule pt for Cath guided MARIAH   5-6 level.  Her last Cath guided MARIAH was 05/17/2023 and she has had > 80% relief of her pain with >50 % improvement in her activity for the first 10-12 months after her procedure the pain has returned and she rates it at 7 today.  Due to the presence of cervical radicular symptoms, we have elected to proceed with cervical epidural steroid injections at the level confirmed by physical examination and accompanying studies. This is medically necessary to improve function, reduce pain and limitations, and to reduce the reliance on pain medications. Additional epidural steroid injections will be based on patient improvement. Cath guided MARIAH @ C5-6 level Dx: (M54.12) - Cervical radiculopathy     The previous urine drug screen was  evaluated 6/17/2024 and it was compliant for the medications that has been prescribed. A presumptive urine drug screen was done today to rapidly obtain and integrate results into clinical assessment and decision-making for ongoing safe prescribing of controlled substances. The results of the presumptive UDS done today was negative for opiates. She is prescribed hydrocodone. Because presumptive UDS positive results are not definitive due to sensitivity and specificity and cross reactivity limitations and negative results do not necessarily indicate absence of drugs or substances in the urine specimen, confirmation will identify specific prescribed and non-prescribed medications or illicit use for ongoing safe prescribing of controlled substances including benzodiazepines, opioid agonist, opioids antagonist, partial agonist, stimulants, muscle relaxers, antidepressants, sleep aids, anti-seizure medicine, and alcohol. Urine drug analysis is used to assist with diagnosis and therapeutic decision-making concerning pretreatment assessment. Intensity and frequency of monitoring with urine drug testing will be based on the risk stratification method in determining risk level for opioid addiction. *UDS Prelim  inconsistent.        We have previously discussed the new CDC guidelines for opioid prescribing practices. We have educated the patient about morphine milliequivalents. Patient has voiced understanding that medications may be decreased over the next several months if we do not find adequate pain control or increased function as result of medication. We have also discussed with the patient that more frequent follow-ups will be based on the amount of pain medications used daily.            Nursing:   Pain Medication/Dose/Last Taken/# Taken  Norco 10-325mg #90     Is it helping? Yes  no Physical Therapy  no Home Exercises  no New medical problems or surgeries  no New medications  no New allergies  no New antibiotics,  fever, infection  Other  cath guided MARIAH C5-6 level. 2018     Cath gudied MARIAH @ C5-6 level. Pre 10 Post 0 2022      Current Medications:   DULoxetine HCl Oral Capsule Delayed Release Particles 60 MG (2024) TAKE ONE CAPSULE BY MOUTH ONCE DAILY  Hydrocodone-Acetaminophen Oral Tablet  MG (2024) Take 1 tablet three times a day as needed for 30 day(s)  Meloxicam Oral Tablet 7.5 MG (2024) TAKE ONE TABLET BY MOUTH ONCE DAILY  TiZANidine HCl Oral Tablet 4 MG (2024) TAKE ONE TABLET BY MOUTH TWICE DAILY      Past Medical History:   There is no past medical history of  Terminal Illness.   severe dementianutritional quality fair      Social History:      Smoking Status: Never smoker; Last Reviewed: 2024                        Alcohol use: Non-Drinker  No drug use  uses seatbelt consistently  Native language: English  Racial background:   Occupation: retired  Marital status:   Patient knows the purpose/use of medications  Patient is taking medications as prescribed               Family History:   Father  History remarkable for  Coronary Artery Disease; Diabetes; father was in his 70's when he ..      Mother  History remarkable for  was in her 50's when she ..   ; pancreatic cancer      Review of Systems:   General:  Patient denies  sweats, fatigue, fever, chills, recent change in weight.  Ears, Nose and Throat:  Patient denies  hearing loss, ringing in the ears, sinus congestion, difficulty swallowing.  Cardiovascular:  Patient denies  arrhythmia, chest pain, palpitations.  Respiratory:  Patient denies  requiring oxygen, shortness of breath, cough, wheezing.  Gastrointestinal:  Patient denies  heartburn, nausea, vomiting, diarrhea, constipation.  Genitourinary:  Patient denies  urinary frequency, urinary hesitancy.  Endocrine:  Patient denies  temperature intolerance, thyroid problems.  Hematologic:  Patient denies  bleeding  tendencies, easy bruising tendency.  Musculoskeletal:  Patient denies  joint pain Location cervical spine Relieved By analgesics - Rx, fractures, walking aids.  Neurologic   Patient admits to   headache, memory lapses or loss.  Patient denies  seizures, not confused or disoriented, paralysis, difficulty walking.  Psychologic:  Patient denies  anxiety, panic attacks, mood disorder, depression, suicidal thoughts, suicide attempt(s).  Skin:  Patient denies  pruritus, jaundice, skin rash.       DEPRESSION SCREENING:   Not at all the patient reports little interest or pleasure in doing things.  Not at all the patient reports feeling down, depressed, or hopeless.  Date Depression Screening Last Done: 09/19/2024   PHQ-2 Score: 0; PHQ-9 Score: incomplete   Several days the patient reports little interest or pleasure in doing things.  Several days the patient reports feeling down, depressed, or hopeless.  Date Depression Screening Last Done: 03/18/2020      Vital Signs:   Weight 177 lbs; Height 5 ft 2 in; BMI 32.4   09/19/2024 8:39 AM (CST)  Temperature 97 °F; Respiration Rate 18; Pulse Rate 71 bpm; Blood Pressure 157 / 76 mm/Hg; Pain Level: 7         Physical Examination:   Cranial Nerves II-XII grossly intact.  No apparent distress.  Patient is alert and oriented times three.  No somnolence or slurred speech.        Cervical spine     Cervical spine has limited ROM with pain and discomfort with flexion extension and lateral rotation  Cervical facet tenderness noted  neck pain that radiates down to her shoulder down her arm. Dx Cervical radiculopathy      Neck Motion:   Cervical range of motion.      Tenderness on Palpation:   Cervical Spine:  There is tenderness on palpation of the  tenderness on palpation of the cervical spine; Paraspinal Tenderness.         Additional Physical Findings:  General general appearance normal,   Awake, alert, well developed, well nourished, in no acute distress, pleasant,  uncomfortable  Musculoskeletal abnormal,   Performing musculoskeletal exam, Shoulders abnormal pain radiates from right side of neck inot shoulder and right hand    pain is intermittenly improved with cervical epidural steroid injections      , cervical spine abnormal, pain elicited by cervical motion, evaluation of neurological symptoms, posture  Neurologic normal neurologic exam,   Cranial nerves, gait and stance abnormal  Skin normal skin exam,   Dry, warm       Toxicology Report   Toxicology was performed.   Reason for Toxicology:  A presumptive urine drug screen was done today to rapidly obtain and integrate results into clinical assessment and decision-making for ongoing safe prescribing of controlled substances.   Test Date/Time: 09/19/2024 00:00   Tested By: ROSA   Oxycodone  (OXY): Result = Negative; Control = Positive   Morphine  (OPI): Result = Positive; Control = Positive   Amphetamines  (AMP): Result = Negative; Control = Positive   Oxazepam  (BZO): Result = Negative; Control = Positive   Methadone  (MTD): Result = Negative; Control = Positive   Secobarbital  (BAR): Result = Negative; Control = Positive   Tricyclic Antidepressants  (TCA): Result = Negative; Control = Positive   Nortriptyline  (TCA): Result = Negative; Control = Positive   Marijuana-Carboxy Tetrahydrocannabinoid   (THC): Result = Negative; Control = Positive   Cocaine  (CATY): Result = Negative; Control = Positive   Ecstasy-Methylenedioxymethamphetamine  (MDMA): Result = Negative; Control = Positive   D Methamphetamine  (MET): Result = Negative; Control = Positive   Phencyclidine  (PCP): Result = Negative; Control = Positive   Adulterants  (OX, SG, pH): Result = Negative; Control = Positive      Assessment:   (M25.511) - Pain in right shoulder  (Z79.891) - Opioid use agreement exists  (M54.2) - Neck pain  (M79.601) - Pain in right arm  (M79.641) - Pain in right hand  (M54.12) - Cervical radiculopathy      Plan:   Physical Activity  Counseling   Nutrition Counseling      Analgesia: Patient reports adequate levels on analgesia.  Activity: Patient encouraged to exercise and continue normal activities.  Adverse Reactions:  No adverse reactions  Aberrant Behavior: No aberrant behavior noted.  appropriate and UDS consistent  Affect: Normal mood.  Adjuncts:        Functional Goals: Patient will have decreased pain with current medications and have increased function/activities.  Progress toward functioning goals: Pt will maintain/and or have increase in function and activity with current meds.  Reason for initiating/continuing opioids: Improve function, reduce pain, reduce use of er/urgent care and minimize organ toxicity from analgesics.  Continue medication doses as currently prescribed:  Other treatment modalities/referrals recommended:  Risks and benefits of test or referrals discussed:  Urine Drug screen ordered:  Contract/agreement signed or updated using lay language.  Follow up interval:  1-3 months  Follow up with Dr. Dang.        1. The patient has chronic nonmalignant pain with pathology likely contributing to the symptoms and based on the improvement of pain and function in response to opioid medications; lack of serious side effects and limited identifiable aberrant behavior; I believe it is reasonable to prescribe opioid therapy which requires a greater than 72 hours supply of opioid therapy. Patient was educated on the potential dependency issues associated with long-term opioid use; as well as the decreasing efficacy with prolonged use. Patient has been advised of the risk/benefits and side effect and how to utilize each medication. Patient was informed that and deviation from therapy protocol could lead to discontinuation of opioids. Patient was given the opportunity to begin weaning off opioids. Patient was given and opportunity to ask and have questions answered regarding the continuation of opioid therapy. At the time  patient is at goal in terms of the pain treatment plan. Patients medications have been reviewed with patient. We will follow up with patient to review effectiveness of medication, and to discuss any potential side effects. The morphine milliequivalents (MME) have been calculated for this patient. According to the CDC guidelines, patients with an MME less than 50 should be monitored for pain and function frequently. Therefore this patient will be monitored every 1-3 months via office visits,  evaluations, and urinary drug screens.     2. Follow up in 2 weeks after procedure.  Patient may contact office for earlier follow-up should an issue arise.     3. Due to the presence of cervical radicular symptoms, we have elected to proceed with cervical epidural steroid injections at the level confirmed by physical examination and accompanying studies. This is medically necessary to improve function, reduce pain and limitations, and to reduce the reliance on pain medications. Additional epidural steroid injections will be based on patient improvement. Cath guided MARIAH @ C5-6 level Dx: (M54.12) - Cervical radiculopathy     4. Procedure instructions given to pt who verbalized understanding. Procedure sheet given to the patient after verbally voicing understanding of the sheet concerning blood thinners, NPO status, and importance of a .     5. Monitored Anesthesia Care medical necessity authorization request:       Monitor anesthesia request is medically indicated for the scheduled __Cath gudied MARIAH ______procedure due to:     - needle phobia and anxiety, placing the patient at risk during the provided service.__x___  - patient has a BMI greater than 45 ____  - patient has severe sleep apnea for which BiPAP and oxygen are needed while sleeping._____  - patient is unable to follow simple commands due to mental state.____  - patient has an ASA class greater than 3 and requires constant presence of an anesthesiologist/CRNA  during the procedure.____  - patient has severe problems with muscles and muscle spasticity that makes it hard to lie still. ____  - patient suffers from chronic pain and is unable to function due to diminished ADL's.____  - patient is dependent on opioids or sedatives.__x__  - Other __x__        6.  In office screening urine drug screen was performed today. The results were reviewed with no illicit drugs detected. The purpose is to ensure compliance with prescribed medications, and to ensure that there are no medications being used outside of those being prescribed. Additionally, these tests are necessary to demonstrate that illicit substances are not being abused while simultaneously taking prescribed controlled substances.     NARCOTIC STATEMENT  Patient is taking the narcotic pain medications as prescribed. Refill is being given because of the benefit to the patient in regards to the pain. Patient has agreed not to abuse medication and not to take it more than what is prescribed. The nature of the drug including the potential for addiction and dependency and abuse was also discussed with the patient. Patient has developed physical dependency for the narcotic pain medication for his pain relief.  Patient has also developed tolerance to the sedative effect of the narcotic pain medications.  Patient has decided to continue with these medications despite potential for addiction as described by this office.  This was stressed to the patient that it is the patient's responsibility to secure the narcotic medication and in any event of loss for any reason whatsoever,  there will be no refill before the next due date. Patient also understands that they are not supposed to drive or work on machinery while taking these medications.  Also explained to the patient that in the event of traffic citation, the presence of this drug in  bloodstream may result in DUI.  Patient has been advised not to drink alcohol while taking  this medication.  Patient has verbalized understanding of our office policy and has signed a contract with us in this regard.        Compliance Statement:  Documented by Shirin Jensen RN acting as a scribe for Hernán Dang M.D. The note accurately reflects work and decisions performed by me.      Prescriptions Written Today:  DULoxetine HCl Oral Capsule Delayed Release Particles 60 MG  Take 1 capsule once a day  Refills: 2  Rx quantity: 30,  Hydrocodone-Acetaminophen Oral Tablet  MG  Take 1 tablet three times a day as needed for 30 day(s)  Refills: No Refills  Rx quantity: 90  Take 1 tablet three times a day as needed for 30 day(s)  Refills: No Refills  Rx quantity: 90  Take 1 tablet three times a day as needed for 30 day(s)  Refills: No Refills  Rx quantity: 90,  Meloxicam Oral Tablet 7.5 MG  Take 1 tablet once a day as needed  Refills: 2  Rx quantity: 30,  TiZANidine HCl Oral Tablet 4 MG  Take 1 tablet twice a day for 30 day(s)  Refills: 2  Rx quantity: 60                 Hernán Dang MD      Electronically signed: 9/22/2024 5:50:31 PM               Chief Complaint:      HPI:       Assessment/Plan:         Hernán Dang MD  Pain Management  [facility] - Pain Management

## 2024-10-16 NOTE — ANESTHESIA PREPROCEDURE EVALUATION
10/16/2024  Kaycee Munguia is a 69 y.o., female.      Pre-op Assessment    I have reviewed the Patient Summary Reports.     I have reviewed the Nursing Notes. I have reviewed the NPO Status.   I have reviewed the Medications.     Review of Systems  Anesthesia Hx:  No problems with previous Anesthesia                Social:  Non-Smoker, No Alcohol Use       Cardiovascular:     Hypertension, well controlled                                          Pulmonary:        Sleep Apnea, CPAP                Endocrine:        Obesity / BMI > 30      Physical Exam  General: Well nourished, Cooperative, Alert and Oriented    Airway:  Mallampati: II   Mouth Opening: Normal  TM Distance: Normal  Tongue: Normal  Neck ROM: Normal ROM    Dental:  Dentures        Anesthesia Plan  Type of Anesthesia, risks & benefits discussed:    Anesthesia Type: MAC  Intra-op Monitoring Plan: Standard ASA Monitors  Post Op Pain Control Plan: multimodal analgesia and IV/PO Opioids PRN  Induction:  IV  Informed Consent: Informed consent signed with the Patient and all parties understand the risks and agree with anesthesia plan.  All questions answered. Patient consented to blood products? Yes  ASA Score: 3  Day of Surgery Review of History & Physical: I have interviewed and examined the patient. I have reviewed the patient's H&P dated: There are no significant changes.     Ready For Surgery From Anesthesia Perspective.     .   Past Medical History:   Diagnosis Date    Hyperlipidemia     Hypertension     Sleep apnea        Past Surgical History:   Procedure Laterality Date    EPIDURAL STEROID INJECTION INTO CERVICAL SPINE N/A 7/20/2022    Procedure: INJECTION, STEROID, SPINE, CERVICAL, EPIDURAL;  Surgeon: Hernán Dang MD;  Location: [facility];  Service: Pain Management;  Laterality: N/A;  C5-6 cath guided MARIAH    EPIDURAL STEROID  INJECTION INTO CERVICAL SPINE N/A 5/17/2023    Procedure: INJECTION, STEROID, SPINE, CERVICAL, EPIDURAL;  Surgeon: Hernán Dang MD;  Location: [facility];  Service: Pain Management;  Laterality: N/A;  C5-6 cath guided MARIAH    HYSTERECTOMY         Family History   Problem Relation Name Age of Onset    Cancer Mother      Heart disease Father         Social History     Socioeconomic History    Marital status: Single   Tobacco Use    Smoking status: Never    Smokeless tobacco: Never   Substance and Sexual Activity    Alcohol use: Not Currently       No current facility-administered medications for this encounter.       Review of patient's allergies indicates:  No Known Allergies  There is no problem list on file for this patient.

## 2024-10-16 NOTE — ANESTHESIA POSTPROCEDURE EVALUATION
Anesthesia Post Evaluation    Patient: Kaycee Munguia    Procedure(s) Performed: Procedure(s) (LRB):  INJECTION, STEROID, SPINE, CERVICAL, EPIDURAL (N/A)    Final Anesthesia Type: MAC      Patient location during evaluation: PACU  Patient participation: Yes- Able to Participate  Level of consciousness: awake and alert, oriented and awake  Post-procedure vital signs: reviewed and stable  Pain management: adequate  Airway patency: patent    PONV status at discharge: No PONV  Anesthetic complications: no      Cardiovascular status: blood pressure returned to baseline, hemodynamically stable and stable  Respiratory status: unassisted and spontaneous ventilation  Hydration status: euvolemic  Follow-up not needed.              Vitals Value Taken Time   /73 10/16/24 1127   Temp 97 10/16/24 1323   Pulse 62 10/16/24 1128   Resp 20 10/16/24 1128   SpO2 99 % 10/16/24 1101   Vitals shown include unfiled device data.      Event Time   Out of Recovery 11:20:00         Pain/Mavis Score: Mavis Score: 10 (10/16/2024 11:05 AM)

## 2024-10-16 NOTE — OP NOTE
PREOPERATIVE DIAGNOSIS:     Cervical Radiculopathy                                                              Neck Pain     POSTOPERATIVE DIAGNOSIS:  Cervical Radiculopathy                                                              Neck Pain     PROCEDURE:  Catheter Guided Cervical Epidural Steroid Injection under Fluoroscopic Guidance at C5-6 level.      SURGEON: Dr Hernán Dang  COMPLICATIONS: None  ANESTHESIA:  MAC  DRAINS AND PACKS:  None  BLOOD LOSS:  None     The patient was identified in the holding area.  The risks and benefits of the procedure were again explained to the patient and the patient agreed to proceed.  The patient was taken in stable condition to the procedure room and was placed in prone position on the C-Arm table.  All pressure points were checked and padded comfortably while the patient was awake.  Time out was completed.  Standard ASA monitors applied.  Anesthesia was initiated.  Patient was comfortable and the neck was then prepped and draped in usual sterile fashion. The skin wheal  using Bupivacaine 0.25% (2.5 mg/ml) 1cc was raised over the C7-T1 interspaces and an 18 gauge needle was advanced under fluoroscopic guidance into the epidural space with loss of resistance confirmed with air.  The stylet was removed and there was negative aspiration of heme and CSF.  A Versicath Catheter was advanced to the target level at  C 5-6  level. The patient then received a 2 cc allotment of Isovue M 300 contrast with excellent delineation within the epidural space.  After confirmation and appropriate placement of the cannula, the patient then received  Kenalog 40mg/ml 1ml with 2ccs of 0.25% Bupivacaine(2.5mg/ml) and 2ccs of preservative free Saline.  The needle was removed with tip intact.  There was adequate hemostasis at the conclusion of the procedure. The patient was taken in stable condition to the holding area and monitored for the appropriate time of convalescence.  The patient  tolerated the procedure well with no adverse events.      The patients preoperative pain score was  10/10.   The postoperative pain score is  /10.

## 2024-10-16 NOTE — PLAN OF CARE
Plan:  D/c pt via wheelchair at 1134  Informed pt if does not void in 8 hours to go to ER. Notify if redness, drainage, from injection site or fever over next 3-4 days. Rest and drink plenty of fluids for the remainder of the day. No lifting over 5 lbs. For the remainder of the day. Continue regular medications as prescribed. May take pain medications as prescribed.     Pain improved 100%

## 2025-06-25 ENCOUNTER — ANESTHESIA (OUTPATIENT)
Dept: PAIN MEDICINE | Facility: HOSPITAL | Age: 70
End: 2025-06-25
Payer: OTHER MISCELLANEOUS

## 2025-06-25 ENCOUNTER — HOSPITAL ENCOUNTER (OUTPATIENT)
Facility: HOSPITAL | Age: 70
Discharge: HOME | End: 2025-06-25
Attending: ANESTHESIOLOGY | Admitting: ANESTHESIOLOGY
Payer: OTHER MISCELLANEOUS

## 2025-06-25 ENCOUNTER — ANESTHESIA EVENT (OUTPATIENT)
Dept: PAIN MEDICINE | Facility: HOSPITAL | Age: 70
End: 2025-06-25
Payer: OTHER MISCELLANEOUS

## 2025-06-25 VITALS
WEIGHT: 181.19 LBS | SYSTOLIC BLOOD PRESSURE: 145 MMHG | TEMPERATURE: 98 F | DIASTOLIC BLOOD PRESSURE: 73 MMHG | BODY MASS INDEX: 32.11 KG/M2 | OXYGEN SATURATION: 99 % | HEART RATE: 56 BPM | HEIGHT: 63 IN | RESPIRATION RATE: 17 BRPM

## 2025-06-25 DIAGNOSIS — M54.12 CERVICAL RADICULOPATHY: ICD-10-CM

## 2025-06-25 PROCEDURE — 62321 NJX INTERLAMINAR CRV/THRC: CPT | Performed by: ANESTHESIOLOGY

## 2025-06-25 PROCEDURE — 63600175 PHARM REV CODE 636 W HCPCS: Performed by: ANESTHESIOLOGY

## 2025-06-25 PROCEDURE — 25500020 PHARM REV CODE 255: Performed by: ANESTHESIOLOGY

## 2025-06-25 PROCEDURE — D9220A PRA ANESTHESIA: Mod: ,,, | Performed by: NURSE ANESTHETIST, CERTIFIED REGISTERED

## 2025-06-25 PROCEDURE — 37000008 HC ANESTHESIA 1ST 15 MINUTES: Performed by: ANESTHESIOLOGY

## 2025-06-25 PROCEDURE — 63600175 PHARM REV CODE 636 W HCPCS: Performed by: NURSE ANESTHETIST, CERTIFIED REGISTERED

## 2025-06-25 RX ORDER — LIDOCAINE HYDROCHLORIDE 20 MG/ML
INJECTION, SOLUTION EPIDURAL; INFILTRATION; INTRACAUDAL; PERINEURAL
Status: DISCONTINUED | OUTPATIENT
Start: 2025-06-25 | End: 2025-06-25

## 2025-06-25 RX ORDER — PROPOFOL 10 MG/ML
VIAL (ML) INTRAVENOUS
Status: DISCONTINUED | OUTPATIENT
Start: 2025-06-25 | End: 2025-06-25

## 2025-06-25 RX ORDER — IOPAMIDOL 612 MG/ML
INJECTION, SOLUTION INTRATHECAL CODE/TRAUMA/SEDATION MEDICATION
Status: DISCONTINUED | OUTPATIENT
Start: 2025-06-25 | End: 2025-06-25 | Stop reason: HOSPADM

## 2025-06-25 RX ORDER — BUPIVACAINE HYDROCHLORIDE 2.5 MG/ML
INJECTION, SOLUTION INFILTRATION; PERINEURAL CODE/TRAUMA/SEDATION MEDICATION
Status: DISCONTINUED | OUTPATIENT
Start: 2025-06-25 | End: 2025-06-25 | Stop reason: HOSPADM

## 2025-06-25 RX ORDER — SODIUM CHLORIDE 9 MG/ML
500 INJECTION, SOLUTION INTRAVENOUS CONTINUOUS
Status: DISCONTINUED | OUTPATIENT
Start: 2025-06-25 | End: 2025-06-25 | Stop reason: HOSPADM

## 2025-06-25 RX ORDER — TRIAMCINOLONE ACETONIDE 40 MG/ML
INJECTION, SUSPENSION INTRA-ARTICULAR; INTRAMUSCULAR CODE/TRAUMA/SEDATION MEDICATION
Status: DISCONTINUED | OUTPATIENT
Start: 2025-06-25 | End: 2025-06-25 | Stop reason: HOSPADM

## 2025-06-25 RX ADMIN — PROPOFOL 40 MG: 10 INJECTION, EMULSION INTRAVENOUS at 11:06

## 2025-06-25 RX ADMIN — PROPOFOL 80 MG: 10 INJECTION, EMULSION INTRAVENOUS at 11:06

## 2025-06-25 RX ADMIN — LIDOCAINE HYDROCHLORIDE 80 MG: 20 INJECTION, SOLUTION EPIDURAL; INFILTRATION; INTRACAUDAL; PERINEURAL at 11:06

## 2025-06-25 NOTE — H&P
[facility] - Pain Management  Pain Management  H&P    Patient Name: Kaycee Munguia  MRN: [id number]  Admission Date: 2025  Primary Care Provider: Megan Hendricks MD    Patient information was obtained from     Subjective:     Principal Problem:  PENELOPE CONTRERAS MD,P.A.  [address] [place] [address],[place] [id number]                      RE: Kaycee Munguia      : 1955   Date of Service: 2025   Medication Refill   Existing Patient 3 month follow up           Chief Complaint:   Joint pain aching in nature, burning, dull, sharp; located in the bilateral shoulder cervical spine lumbar spine wrist; aggravated by activity; relieved by rest - lying down, narcotics; gradual in occurrence constant; pain rated as 7/10 on the pain scale,  Neck pain described as aching, burning, stabbing, tingling; Location bilaterally, radiating to shoulder(s); associated with headache, numbness, paresthesias; aggravated by activity; relieved by rest-lying down; onset constant; reported as 7/10 on pain scale; Relieved by medication and injections; on today's visit the patient's pain is worse in nature from last visit.   Patient seated in room 4 with c/o multiple joint, neck pain.  Controlled Substance Prescription Agreement signed and reviewed. Verbalizes understanding.  [place] Prescription Monitoring Program data was reviewed for this patient for the past 12 calendar months to ascertain any current ,or past use of scheduled medications. Good                                                                                    Opioid Risk Tool                                                                                 Jose each box                           Item Score                        Item Score                                                                              that applies.                               if Female.                          if Male                    1. Family history of substance abuse                   Alcohol  (  )                                      1                                     3                                                                              Illegal Drugs (  )                               2                                     3                                                                              Prescription Drugs (  )                     4                                     4        2. Personal History of substance abuse          Alcohol  (  )                                      3                                      3                                                                             Illegal Drugs (  )                               4                                     4                                                                             Prescription Drugs (  )                     5                                      5     3. Age (Jose box if 16-45)                                           (  )                                          1                                      1        4. History of Preadolescent Sexual Abuse                   (  )                                         3                                      0        5. Psychological Disease    Attention Deficit disorder  (  )                                         2                                       2                                                             or                                              Obsessive Compulsive                                                           disorder                                                               or                                                                                       Bipolar Schizophrenia                                                              Depression                        (  )                                         1                                        1        Total=0     Total Score  Risk Category           Low risk 0-3         Moderate risk 4-7              High risk >8      History of Present Illness:   What part of the body? neck pain   Pain level at best 2; Pain level at worst 10; Pain level at present 4   Chronic neck pain r/t workers comp injury.      06/23/2022-Pt here for a follow up and would like to schedule procedure for her neck and arm pain. She is having pain that radiates down her arm into her shoulder, down her arm to her hand. After review of her chart we will plan to continue her meds and set her up for cervical cath guided MARIAH at C5-6 level for dx of cervical radiculopathy.     07/20/2022-Cath guided MARIAH @ C5-6 level.      08/04/2022-Pt here for a follow up after procedure. She reports that her neck and arm pain are improved after the injection. With the injection and her pain meds. her pain today is a 0. We will continue her meds and see her back in 3 months.      01/23/2023-Patient presents today for follow-up evaluation for pain problems. Patient has no new complaints today, and presents for medical management. There is no abuse or diversion of the patient's medications. The patient is tolerating the current regimen well with no adverse events, side effects, or untoward complications. Activities are tolerated well and function is maintained with current pain medications, UDS reviewed. Patient presents for refills of medications.  reviewed and shows consistency.     04/06/2023- Pt here for a follow up for her meds and she is ready to set up Cervical MARIAH for her neck and arm pain. After review of her records and discussion with the pt we will plan to schedule her for Cath guided MARIAH . Her last Cath guided MARIAH was 07/20/2022 and she has had > 80% relief of her pain with >50 % improvement in her activity for the first 3-4 months after her procedure the pain has returned and she rates it at 81/2 today.      5/17/23 Cath Guided MARIAH @ C5-6 Pre 10 Post 0     06/15/2023- pt here  for a follow up after her procedure. Her pain today is 0/10 and reports that the injection helped with her pain by > 80% and this improved her activity. Her medications also help with her pain . We will continue her meds and see her back in 3 mo.      09/18/2023-Current Treatment: Medications and procedures. There are no new significant side effects or excessive drowsiness from medications.  Patient presents today for follow-up evaluation for pain problems. Patient has no new complaints today, and presents for medical management. There is no abuse or diversion of the patient's medications. The patient is tolerating the current regimen well with no adverse events, side effects, or untoward complications. Activities are tolerated well and function is maintained with current pain medications, UDS reviewed. Pt defers procedures @ this time. Will plan to continue current medications and follow up in 3 months.     12/11/2023-Current Treatment: Medications and procedures. There are no new significant side effects or excessive drowsiness from medications.  Patient presents today for follow-up evaluation for pain problems. Patient has no new complaints today, and presents for medical management. There is no abuse or diversion of the patient's medications. The patient is tolerating the current regimen well with no adverse events, side effects, or untoward complications. Activities are tolerated well and function is maintained with current pain medications, UDS reviewed. Pt defers procedures @ this time. Will plan to continue current medications and follow up in 3 months.     03/18/2024-Current Treatment: Medications and procedures. There are no new significant side effects or excessive drowsiness from medications.Patient presents today for follow-up evaluation for pain problems. Patient has no new complaints today, and presents for medical management. There is no abuse or diversion of the patient's medications. The patient is  tolerating the current regimen well with no adverse events, side effects, or untoward complications. Activities are tolerated well and function is maintained with current pain medications, DS reviewed. Pt defers procedures @ this time. Will plan to continue current medications and follow up in 3 months.     06/17/024-Current Treatment: Medications and procedures. There are no new significant side effects or excessive drowsiness from medications.  Patient presents today for follow-up evaluation for pain problems. Patient has no new complaints today, and presents for medical management. There is no abuse or diversion of the patient's medications. The patient is tolerating the current regimen well with no adverse events, side effects, or untoward complications. Activities are tolerated well and function is maintained with current pain medications, UDS reviewed. Pt defers procedures @ this time. Will plan to continue current medications and follow up in 3 months.     09/19/2024-pt here for a follow up and reports that she would like to schedule procedure for neck and arm pain that she has had in the past. WE have reviewed her records and will plan to schedule pt for Cath guided MARIAH   5-6 level.  Her last Cath guided MARIAH was 05/17/2023 and she has had > 80% relief of her pain with >50 % improvement in her activity for the first 10-12 months after her procedure the pain has returned and she rates it at 7 today.  Due to the presence of cervical radicular symptoms, we have elected to proceed with cervical epidural steroid injections at the level confirmed by physical examination and accompanying studies. This is medically necessary to improve function, reduce pain and limitations, and to reduce the reliance on pain medications. Additional epidural steroid injections will be based on patient improvement. Cath guided MARIAH @ C5-6 level Dx: (M54.12) - Cervical radiculopathy     10/16/24 @ [facility]--Cath Guided MARIAH @ C5-6 Pre 10 Post  0     11/04/2024-pt here for a follow up after her procedure. and reports that she has had >99% improvement in her pain and her activity along with her meds. We will continue her meds and see her back in 3 mo.      02/03/2025-Current Treatment: Medications and procedures. There are no new significant side effects or excessive drowsiness from medications Patient presents today for follow-up evaluation for pain problems. Patient has no new complaints today, and presents for medical management. There is no abuse or diversion of the patient's medications. The patient is tolerating the current regimen well with no adverse events, side effects, or untoward complications. Activities are tolerated well and function is maintained with current pain medications, UDS reviewed. Pt defers procedures @ this time. Will plan to continue current medications and follow up in 3 months.     04/28/2025: Patient presents today in follow up of cervical radiculopathy and ongoing neck and shoulder pain. She previously had a cervical MARIAH at C5-6 with excellent results. She wants to repeat the procedure.Due to the presence of cervical radicular symptoms, we have elected to proceed with cervical epidural steroid injections at the level confirmed by physical examination and accompanying studies. This is medically necessary to improve function, reduce pain and limitations, and to reduce the reliance on pain medications. Additional epidural steroid injections will be based on patient improvement.   C5-6 Cervical MARIAH with catheter.        The previous urine drug screen was evaluated 02/03/2025 and it was compliant for the medications that has been prescribed.  A presumptive urine drug screen was done today to rapidly obtain and integrate results into clinical assessment and decision-making for ongoing safe prescribing of controlled substances. The results of the presumptive UDS done today was positive for opiates. She is prescribed hydrocodone.  Because presumptive UDS positive results are not definitive due to sensitivity and specificity and cross reactivity limitations and negative results do not necessarily indicate absence of drugs or substances in the urine specimen, confirmation will identify specific prescribed and non-prescribed medications or illicit use for ongoing safe prescribing of controlled substances including benzodiazepines, opioid agonist, opioids antagonist, partial agonist, stimulants, muscle relaxers, antidepressants, sleep aids, anti-seizure medicine, and alcohol. Urine drug analysis is used to assist with diagnosis and therapeutic decision-making concerning pretreatment assessment. Intensity and frequency of monitoring with urine drug testing will be based on the risk stratification method in determining risk level for opioid addiction. *UDS Prelim  Consistent.     We have previously discussed the new CDC guidelines for opioid prescribing practices. We have educated the patient about morphine milliequivalents. Patient has voiced understanding that medications may be decreased over the next several months if we do not find adequate pain control or increased function as result of medication. We have also discussed with the patient that more frequent follow-ups will be based on the amount of pain medications used daily.            Nursing:   Pain Medication/Dose/Last Taken/# Taken  Norco 10-325mg #90     Is it helping? Yes  no Physical Therapy  no Home Exercises  no New medical problems or surgeries  no New medications  no New allergies  no New antibiotics, fever, infection  Other  cath guided MARIAH C5-6 level. 04/19/2018 1/29/2019 09/05/2019     Cath gaurav MARIAH @ C5-6 level. Pre 10 Post 0 7/20/2022  10/16/24 @ [facility]--Cath Guided MARIAH @ C5-6 Pre 10 Post 0      Current Medications:   DULoxetine HCl Oral Capsule Delayed Release Particles 60 MG (4/28/2025) Take 1 capsule once a day  Hydrocodone-Acetaminophen Oral Tablet  MG (4/28/2025)  Take 1 tablet three times a day as needed for 30 day(s)  Meloxicam Oral Tablet 7.5 MG (2025) Take 1 tablet once a day as needed  TiZANidine HCl Oral Tablet 4 MG (2025) Take 1 tablet twice a day for 30 day(s)      Past Medical History:   There is no past medical history of  Terminal Illness.   severe dementianutritional quality fair      Social History:      Smoking Status: Never smoker; Last Reviewed: 2025                        Alcohol use: Non-Drinker  No drug use  uses seatbelt consistently  Native language: English  Racial background:   Occupation: retired  Marital status:   Patient knows the purpose/use of medications  Patient is taking medications as prescribed               Family History:   Father  History remarkable for  Coronary Artery Disease; Diabetes; father was in his 70's when he ..      Mother  History remarkable for  was in her 50's when she ..   ; pancreatic cancer      Review of Systems:   General:  Patient denies  sweats, fatigue, fever, chills, recent change in weight.  Ears, Nose and Throat:  Patient denies  hearing loss, ringing in the ears, sinus congestion, difficulty swallowing.  Cardiovascular:  Patient denies  arrhythmia, chest pain, palpitations.  Respiratory:  Patient denies  requiring oxygen, shortness of breath, cough, wheezing.  Gastrointestinal:  Patient denies  heartburn, nausea, vomiting, diarrhea, constipation.  Genitourinary:  Patient denies  urinary frequency, urinary hesitancy.  Endocrine:  Patient denies  temperature intolerance, thyroid problems.  Hematologic:  Patient denies  bleeding tendencies, easy bruising tendency.  Musculoskeletal:  Patient denies  joint pain Location cervical spine Relieved By analgesics - Rx, fractures, walking aids.  Neurologic   Patient admits to   headache, memory lapses or loss.  Patient denies  seizures, not confused or disoriented, paralysis, difficulty walking.  Psychologic:  Patient  denies  anxiety, panic attacks, mood disorder, depression, suicidal thoughts, suicide attempt(s).  Skin:  Patient denies  pruritus, jaundice, skin rash.       DEPRESSION SCREENING:   Not at all the patient reports little interest or pleasure in doing things.  Not at all the patient reports feeling down, depressed, or hopeless.  Date Depression Screening Last Done: 02/03/2025   PHQ-2 Score: 0; PHQ-9 Score: incomplete   Several days the patient reports little interest or pleasure in doing things.  Several days the patient reports feeling down, depressed, or hopeless.  Date Depression Screening Last Done: 03/18/2020      Vital Signs:   Weight 177 lbs; Height 5 ft 2 in; BMI 32.4   04/28/2025 8:08 AM (CST)  Temperature 97 °F; Respiration Rate 18; Pulse Rate 71 bpm; Blood Pressure 110 / 70 mm/Hg; Pain Level: 6         Physical Examination:   Cranial Nerves II-XII grossly intact.  No apparent distress.  Patient is alert and oriented times three.  No somnolence or slurred speech.        Cervical spine     Cervical spine has limited ROM with pain and discomfort with flexion extension and lateral rotation  Cervical facet tenderness noted  neck pain that radiates down to her shoulder down her left and right arms. Dx Cervical radiculopathy        Neck Motion:   Cervical range of motion.      Tenderness on Palpation:   Cervical Spine:  There is tenderness on palpation of the  tenderness on palpation of the cervical spine; Paraspinal Tenderness.         Additional Physical Findings:  General general appearance normal,   Awake, alert, well developed, well nourished, in no acute distress, pleasant, uncomfortable  Musculoskeletal abnormal,   Performing musculoskeletal exam, Shoulders abnormal pain radiates from right side of neck inot shoulder and right hand    pain is intermittenly improved with cervical epidural steroid injections      , cervical spine abnormal, pain elicited by cervical motion, evaluation of neurological  symptoms, posture  Neurologic normal neurologic exam,   Cranial nerves, gait and stance abnormal  Skin normal skin exam,   Dry, warm       Toxicology Report   Toxicology was performed.   Reason for Toxicology:  A presumptive urine drug screen was done today to rapidly obtain and integrate results into clinical assessment and decision-making for ongoing safe prescribing of controlled substances.                                       Assessment:   (M25.511) - Pain in right shoulder  (Z79.891) - Opioid use agreement exists  (M54.2) - Neck pain  (M79.601) - Pain in right arm  (M79.641) - Pain in right hand  (M54.12) - Cervical radiculopathy      Plan:   Physical Activity Counseling   Nutrition Counseling         Analgesia: Patient reports adequate levels on analgesia.  Activity: Patient encouraged to exercise and continue normal activities.  Adverse Reactions:  No adverse reactions  Aberrant Behavior: No aberrant behavior noted.  appropriate and UDS consistent  Affect: Normal mood.  Adjuncts:        Functional Goals: Patient will have decreased pain with current medications and have increased function/activities.  Progress toward functioning goals: Pt will maintain/and or have increase in function and activity with current meds.  Reason for initiating/continuing opioids: Improve function, reduce pain, reduce use of er/urgent care and minimize organ toxicity from analgesics.  Continue medication doses as currently prescribed:  Other treatment modalities/referrals recommended:  Risks and benefits of test or referrals discussed:  Urine Drug screen ordered:  Contract/agreement signed or updated using lay language.  Follow up interval:  1-3 months  Follow up with Dr. Dang.        1. The patient has chronic nonmalignant pain with pathology likely contributing to the symptoms and based on the improvement of pain and function in response to opioid medications; lack of serious side effects and limited identifiable aberrant  behavior; I believe it is reasonable to prescribe opioid therapy which requires a greater than 72 hours supply of opioid therapy. Patient was educated on the potential dependency issues associated with long-term opioid use; as well as the decreasing efficacy with prolonged use. Patient has been advised of the risk/benefits and side effect and how to utilize each medication. Patient was informed that and deviation from therapy protocol could lead to discontinuation of opioids. Patient was given the opportunity to begin weaning off opioids. Patient was given and opportunity to ask and have questions answered regarding the continuation of opioid therapy. At the time patient is at goal in terms of the pain treatment plan. Patients medications have been reviewed with patient. We will follow up with patient to review effectiveness of medication, and to discuss any potential side effects. The morphine milliequivalents (MME) have been calculated for this patient. According to the CDC guidelines, patients with an MME less than 50 should be monitored for pain and function frequently. Therefore this patient will be monitored every 1-3 months via office visits,  evaluations, and urinary drug screens.     2. Follow up in 2 weeks after procedure.  Patient may contact office for earlier follow-up should an issue arise.     3. Due to the presence of cervical radicular symptoms, we have elected to proceed with cervical epidural steroid injections at the level confirmed by physical examination and accompanying studies. This is medically necessary to improve function, reduce pain and limitations, and to reduce the reliance on pain medications. Additional epidural steroid injections will be based on patient improvement.   C5-6 Cervical MARIAH with catheter.        4. Procedure instructions given to pt who verbalized understanding. Procedure sheet given to the patient after verbally voicing understanding of the sheet concerning blood  thinners, NPO status, and importance of a .     5. Monitored Anesthesia Care medical necessity authorization request:       Monitor anesthesia request is medically indicated for the scheduled ________procedure due to:     - needle phobia and anxiety, placing the patient at risk during the provided service._____  - patient has a BMI greater than 45 ____  - patient has severe sleep apnea for which BiPAP and oxygen are needed while sleeping._____  - patient is unable to follow simple commands due to mental state.____  - patient has an ASA class greater than 3 and requires constant presence of an anesthesiologist/CRNA during the procedure.____  - patient has severe problems with muscles and muscle spasticity that makes it hard to lie still. ____  - patient suffers from chronic pain and is unable to function due to diminished ADL's.____  - patient is dependent on opioids or sedatives.____  - Other ____     NARCOTIC STATEMENT  Patient is taking the narcotic pain medications as prescribed. Refill is being given because of the benefit to the patient in regards to the pain. Patient has agreed not to abuse medication and not to take it more than what is prescribed. The nature of the drug including the potential for addiction and dependency and abuse was also discussed with the patient. Patient has developed physical dependency for the narcotic pain medication for his pain relief.  Patient has also developed tolerance to the sedative effect of the narcotic pain medications.  Patient has decided to continue with these medications despite potential for addiction as described by this office.  This was stressed to the patient that it is the patient's responsibility to secure the narcotic medication and in any event of loss for any reason whatsoever,  there will be no refill before the next due date. Patient also understands that they are not supposed to drive or work on machinery while taking these medications.  Also  explained to the patient that in the event of traffic citation, the presence of this drug in  bloodstream may result in DUI.  Patient has been advised not to drink alcohol while taking this medication.  Patient has verbalized understanding of our office policy and has signed a contract with us in this regard.        Compliance Statement:  Documented by Shirin Jensen RN acting as a scribe for Hernán Dang M.D. The note accurately reflects work and decisions performed by me.      Prescriptions Written Today:  DULoxetine HCl Oral Capsule Delayed Release Particles 60 MG  Take 1 capsule once a day  Refills: 2  Rx quantity: 30,  Hydrocodone-Acetaminophen Oral Tablet  MG  Take 1 tablet three times a day as needed for 30 day(s)  Refills: No Refills  Rx quantity: 90  Take 1 tablet three times a day as needed for 30 day(s)  Refills: No Refills  Rx quantity: 90,  Meloxicam Oral Tablet 7.5 MG  Take 1 tablet once a day as needed  Refills: 2  Rx quantity: 30,  TiZANidine HCl Oral Tablet 4 MG  Take 1 tablet twice a day for 30 day(s)  Refills: 2  Rx quantity: 60                 Hernán Dang MD      Electronically signed: 4/28/2025 9:23:07 PM       Chief Complaint:      HPI:       Assessment/Plan:         Hernán Dang MD  Pain Management  [facility]

## 2025-06-25 NOTE — PLAN OF CARE
Plan:  D/c pt via wheelchair at 1205  Informed pt if does not void in 8 hours to go to ER. Notify if redness, drainage, from injection site or fever over next 3-4 days. Rest and drink plenty of fluids for the remainder of the day. No lifting over 5 lbs. For the remainder of the day. Continue regular medications as prescribed. May take pain medications as prescribed.     Pain improved 100%  Pre-procedure pain: 10  Post-procedure pain: 0

## 2025-06-25 NOTE — OP NOTE
06/25/2025  Kaycee Munguia 1955      PREOPERATIVE DIAGNOSIS:     Cervical Radiculopathy                                                              Neck Pain     POSTOPERATIVE DIAGNOSIS:  Cervical Radiculopathy                                                              Neck Pain     PROCEDURE:  Catheter Guided Cervical Epidural Steroid Injection under Fluoroscopic Guidance at C5-6 level.      SURGEON: Dr Hernán Dang  COMPLICATIONS: None  ANESTHESIA:  MAC  DRAINS AND PACKS:  None  BLOOD LOSS:  None     The patient was identified in the holding area.  The risks and benefits of the procedure were again explained to the patient and the patient agreed to proceed.  The patient was taken in stable condition to the procedure room and was placed in prone position on the C-Arm table.  All pressure points were checked and padded comfortably while the patient was awake.  Time out was completed.  Standard ASA monitors applied.  Anesthesia was initiated.  Patient was comfortable and the neck was then prepped and draped in usual sterile fashion. The skin wheal  using Bupivacaine 0.25% (2.5 mg/ml) 1cc was raised over the C7-T1 interspaces and an 18 gauge needle was advanced under fluoroscopic guidance into the epidural space with loss of resistance confirmed with air.  The stylet was removed and there was negative aspiration of heme and CSF.  A Versicath Catheter was advanced to the target level at  C 5-6  level. The patient then received a 2 cc allotment of Omnipaque(240mg/ml) contrast with excellent delineation within the epidural space.  After confirmation and appropriate placement of the cannula, the patient then received  Kenalog 40mg/ml 1ml with 2ccs of 0.25% Bupivacaine(2.5mg/ml) and 2ccs of preservative free Saline.  The needle was removed with tip intact.  There was adequate hemostasis at the conclusion of the procedure. The patient was taken in stable condition to the holding area and monitored for the  appropriate time of convalescence.  The patient tolerated the procedure well with no adverse events.      The patients preoperative pain score was  10/10.   The postoperative pain score is  /10.

## 2025-06-25 NOTE — ANESTHESIA PREPROCEDURE EVALUATION
06/25/2025  Kaycee Munguia is a 70 y.o., female.    Past Medical History:   Diagnosis Date    Hyperlipidemia     Hypertension     Sleep apnea        Past Surgical History:   Procedure Laterality Date    EPIDURAL STEROID INJECTION INTO CERVICAL SPINE N/A 7/20/2022    Procedure: INJECTION, STEROID, SPINE, CERVICAL, EPIDURAL;  Surgeon: Hernán Dang MD;  Location: [facility];  Service: Pain Management;  Laterality: N/A;  C5-6 cath guided MARIAH    EPIDURAL STEROID INJECTION INTO CERVICAL SPINE N/A 5/17/2023    Procedure: INJECTION, STEROID, SPINE, CERVICAL, EPIDURAL;  Surgeon: Hernán Dang MD;  Location: [facility];  Service: Pain Management;  Laterality: N/A;  C5-6 cath guided MARIAH    EPIDURAL STEROID INJECTION INTO CERVICAL SPINE N/A 10/16/2024    Procedure: INJECTION, STEROID, SPINE, CERVICAL, EPIDURAL;  Surgeon: Hernán Dang MD;  Location: [facility];  Service: Pain Management;  Laterality: N/A;  C5-6 cath guided MARIAH    HYSTERECTOMY         Family History   Problem Relation Name Age of Onset    Cancer Mother      Heart disease Father         Social History     Socioeconomic History    Marital status: Single   Tobacco Use    Smoking status: Never    Smokeless tobacco: Never   Substance and Sexual Activity    Alcohol use: Not Currently     Social Drivers of Health     Financial Resource Strain: Low Risk  (5/13/2025)    [facility] [employer] [facility]    Overall Financial Resource Strain (CARDIA)     Difficulty of Paying Living Expenses: Not hard at all   Food Insecurity: No Food Insecurity (5/13/2025)    [email or web address] [email or web address] [facility]    Hunger Vital Sign     Worried About Running Out of Food in the Last Year: Never true     Ran Out of Food in the Last Year: Never true   Transportation Needs: No Transportation Needs  (5/13/2025)    Received from [facility]    PRAPARE - Transportation     Lack of Transportation (Medical): No     Lack of Transportation (Non-Medical): No   Physical Activity: Insufficiently Active (5/13/2025)    Received from [facility]    Exercise Vital Sign     Days of Exercise per Week: 3 days     Minutes of Exercise per Session: 30 min   Stress: No Stress Concern Present (5/13/2025)    Received from [facility]    [employer] - Occupational Stress Questionnaire     Feeling of Stress : Not at all   Housing Stability: Low Risk  (5/13/2025)    Received from [facility]    Housing Stability Vital Sign     Unable to Pay for Housing in the Last Year: No     Number of Times Moved in the Last Year: 0     Homeless in the Last Year: No       Current Medications[1]    Review of patient's allergies indicates:  No Known Allergies       Pre-op Assessment    I have reviewed the Patient Summary Reports.     I have reviewed the Nursing Notes. I have reviewed the NPO Status.   I have reviewed the Medications.     Review of Systems  Anesthesia Hx:  No problems with previous Anesthesia                Social:  Non-Smoker       Cardiovascular:     Hypertension           hyperlipidemia                         Hypertension         Pulmonary:        Sleep Apnea     Obstructive Sleep Apnea (HUBER).           Neurological:        Chronic Pain Syndrome                         Endocrine:        Obesity / BMI > 30         Anesthesia Plan  Type of Anesthesia, risks & benefits discussed:    Anesthesia Type: Gen Natural Airway, MAC  Intra-op Monitoring Plan: Standard ASA Monitors  Post Op Pain Control Plan: IV/PO Opioids PRN  Induction:  IV  Informed Consent: Informed consent signed with the Patient and all parties understand the risks and agree with anesthesia plan.  All questions answered. Patient  consented to blood products? Yes  ASA Score: 3  Day of Surgery Review of History & Physical: H&P Update referred to the surgeon/provider.I have interviewed and examined the patient. I have reviewed the patient's H&P dated: There are no significant changes.     Ready For Surgery From Anesthesia Perspective.     .           [1]   No current facility-administered medications for this encounter.     Current Outpatient Medications   Medication Sig Dispense Refill    amLODIPine (NORVASC) 10 MG tablet Take 10 mg by mouth once daily.      atorvastatin (LIPITOR) 20 MG tablet Take 20 mg by mouth once daily.      DULoxetine (CYMBALTA) 60 MG capsule Take 60 mg by mouth once daily.      HYDROcodone-acetaminophen (NORCO)  mg per tablet Take 1 tablet by mouth every 8 (eight) hours as needed for Pain.      meloxicam (MOBIC) 7.5 MG tablet Take 7.5 mg by mouth once daily.      tiZANidine (ZANAFLEX) 4 MG tablet Take 4 mg by mouth 2 (two) times a day.

## 2025-06-25 NOTE — TRANSFER OF CARE
"Anesthesia Transfer of Care Note    Patient: Kaycee Munguia    Procedure(s) Performed: Procedure(s) (LRB):  INJECTION, STEROID, SPINE, CERVICAL, EPIDURAL (N/A)    Patient location: Other: [facility]    Anesthesia Type: MAC    Transport from OR: Transported from OR on room air with adequate spontaneous ventilation    Post pain: adequate analgesia    Post assessment: no apparent anesthetic complications    Post vital signs: stable    Level of consciousness: sedated and responds to stimulation    Nausea/Vomiting: no nausea/vomiting    Complications: none    Transfer of care protocol was followedComments: Good SV continue, NAD noted, VSS, RTRN    Last vitals: Visit Vitals  BP (!) 149/69   Pulse 92   Temp 36.4 °C (97.5 °F)   Resp 19   Ht 5' 3" (1.6 m)   Wt 82.2 kg (181 lb 3.2 oz)   SpO2 100%   BMI 32.10 kg/m²     "

## 2025-06-25 NOTE — ANESTHESIA POSTPROCEDURE EVALUATION
Anesthesia Post Evaluation    Patient: Kaycee Munguia    Procedure(s) Performed: Procedure(s) (LRB):  INJECTION, STEROID, SPINE, CERVICAL, EPIDURAL (N/A)    Final Anesthesia Type: MAC      Patient location during evaluation: PACU  Patient participation: Yes- Able to Participate  Level of consciousness: awake and alert  Post-procedure vital signs: reviewed and stable  Pain management: adequate  Airway patency: patent    PONV status at discharge: No PONV  Anesthetic complications: no      Cardiovascular status: stable  Respiratory status: unassisted  Hydration status: euvolemic  Follow-up not needed.              Vitals Value Taken Time   /70 06/25/25 11:33   Temp 97.4 06/25/25 11:34   Pulse 71 06/25/25 11:33   Resp 18 06/25/25 11:33   SpO2 100 % 06/25/25 11:33   Vitals shown include unfiled device data.      No case tracking events are documented in the log.      Pain/Mavis Score: Mavis Score: 8 (6/25/2025 11:24 AM)

## 2025-06-25 NOTE — DISCHARGE SUMMARY
Patient underwent  Catheter Guided Cervical Epidural Steroid Injection under Fluoroscopic Guidance at C 5-6 level   procedure /2025. The pt will follow up in clinic. Discharged home. Discharge Dx: Cervical Radiculopathy

## (undated) DEVICE — CATH INTROCAN SAF 2 IV 22GX1IN

## (undated) DEVICE — TRAY EPIDURAL SINGLE SHOT(PMA)

## (undated) DEVICE — TRAY EPIDURAL SINGLE SHOT PMA

## (undated) DEVICE — CATH VERSA-KATH 21G 12IN CLEAR

## (undated) DEVICE — GLOVE SENSICARE PI SURG 6.5

## (undated) DEVICE — SLIPPER FALL PREV YEL BARIAT

## (undated) DEVICE — DRAPE IOBAN2 INCISE 10.5X8IN

## (undated) DEVICE — GLOVE SENSICARE PI ALOE 8

## (undated) DEVICE — APPLICATOR CHLORAPREP HI-LITE TINTED ORANGE 26ML

## (undated) DEVICE — GLOVE PROTEXIS PI SYN SURG 7.5

## (undated) DEVICE — NEEDLE TUOHY 20G X 6 IN GRAD/WING BX/25

## (undated) DEVICE — CATH IV 20GAX1.25 JELCO

## (undated) DEVICE — SET IV SOL CONTIN-FLOW 10 DROP/ML (PRIMARY)

## (undated) DEVICE — GLOVE PROTEXIS PI SYN SURG 6.5

## (undated) DEVICE — GLOVE SURGICAL PROTEXIS PI SIZE 6.5

## (undated) DEVICE — KIT IV START

## (undated) DEVICE — GLOVE SURGICAL PROTEXIS PI SIZE 7.5

## (undated) DEVICE — SYR EPILOR LUER-LOK LOR 7ML

## (undated) DEVICE — NDL TUOHY EPIDRL PNK 18GX3.5IN

## (undated) DEVICE — APPLICATOR CHLORAPREP ORN 26ML

## (undated) DEVICE — SET EXT STD BORE CATH 7.6IN

## (undated) DEVICE — TRAY NERVE BLOCK UNIV 10/CA

## (undated) DEVICE — CATH VERSA-KATH RADIO-OPAQUE

## (undated) DEVICE — KIT IV START RUSH

## (undated) DEVICE — CATH IV INTROCAN 24G X 3/4

## (undated) DEVICE — SOL CONTINU-FLO SET 2 LAV

## (undated) DEVICE — CATH IV JELCO 22GX1 IN

## (undated) DEVICE — SYRINGE 3CC LL 25GX5/8IN